# Patient Record
Sex: FEMALE | Employment: OTHER | URBAN - METROPOLITAN AREA
[De-identification: names, ages, dates, MRNs, and addresses within clinical notes are randomized per-mention and may not be internally consistent; named-entity substitution may affect disease eponyms.]

---

## 2024-11-19 ENCOUNTER — OFFICE VISIT (OUTPATIENT)
Age: 54
End: 2024-11-19

## 2024-11-19 VITALS
OXYGEN SATURATION: 95 % | BODY MASS INDEX: 31.34 KG/M2 | HEIGHT: 66 IN | DIASTOLIC BLOOD PRESSURE: 84 MMHG | TEMPERATURE: 98.2 F | SYSTOLIC BLOOD PRESSURE: 132 MMHG | HEART RATE: 87 BPM | WEIGHT: 195 LBS

## 2024-11-19 DIAGNOSIS — R20.0 NUMBNESS OF LIP: ICD-10-CM

## 2024-11-19 DIAGNOSIS — R42 DIZZINESS: Primary | ICD-10-CM

## 2024-11-19 RX ORDER — PROPRANOLOL HYDROCHLORIDE 10 MG/1
10 TABLET ORAL 3 TIMES DAILY
COMMUNITY

## 2024-11-19 ASSESSMENT — ENCOUNTER SYMPTOMS: RHINORRHEA: 1

## 2024-11-19 NOTE — PROGRESS NOTES
Colette Ramirez (:  1970) is a 54 y.o. female,New patient, here for evaluation of the following chief complaint(s):  Dizziness (Dizziness and nausea, pt. States it feels like she's spinning and when she walks she feels like she's going to fall x 2-3 weeks, pt. States the problem has been gradually getting worse ) and Care Coordination      ASSESSMENT/PLAN:    ICD-10-CM    1. Dizziness  R42       2. Numbness of lip  R20.0         Patient presented to the urgent care with dizziness. No signs of AOM, AOE, or mid-ear effusion. No history of vertigo. The dizziness has been ongoing for 2-3 weeks and worsening. It remains constant with lying, sitting, and standing. She feels that she herself is \"spinning.\" She also is complaining on right-sided lip numbness over the last 2-3 days. Based on her symptoms, patient was sent to the emergency room for further work up and to rule out/in a CVA/TIA. Discussed this with patient. She is understanding and agreeable to this. Her brother is driving her to [facility]. Soheila, ER Charge RN was given report on patient. Denies any questions at this time.    Dx Disposition: CVA, TIA, vertigo  Education and handout provided on diagnosis and management of symptoms.   AVS reviewed with patient. Follow up as needed in UC or with PCP for new or worsening symptoms.   Return if symptoms worsen or fail to improve.    SUBJECTIVE/OBJECTIVE:  Patient presents to the clinic with complaints of feeling that she is spinning, not the room, but her. This started a couple of weeks ago with a head cold, but has worsened. Constant with standing, sitting, and laying down. Endorses some slight congestion and left ear pain. Denies any fever or body aches. She hasn't taken anything for her symptoms. No history of vertigo.        History provided by:  Patient   used: No    Dizziness      Vitals:    24 1241 24 1309   BP: (!) 140/85 132/84   Site: Left Upper Arm    Position: